# Patient Record
Sex: FEMALE | Race: BLACK OR AFRICAN AMERICAN | Employment: UNEMPLOYED | URBAN - METROPOLITAN AREA
[De-identification: names, ages, dates, MRNs, and addresses within clinical notes are randomized per-mention and may not be internally consistent; named-entity substitution may affect disease eponyms.]

---

## 2017-01-10 ENCOUNTER — OFFICE VISIT (OUTPATIENT)
Dept: FAMILY MEDICINE CLINIC | Age: 62
End: 2017-01-10

## 2017-01-10 ENCOUNTER — HOSPITAL ENCOUNTER (OUTPATIENT)
Dept: LAB | Age: 62
Discharge: HOME | End: 2017-01-10
Payer: COMMERCIAL

## 2017-01-10 VITALS
SYSTOLIC BLOOD PRESSURE: 110 MMHG | HEART RATE: 77 BPM | DIASTOLIC BLOOD PRESSURE: 70 MMHG | RESPIRATION RATE: 20 BRPM | TEMPERATURE: 98.4 F | OXYGEN SATURATION: 99 % | BODY MASS INDEX: 22.71 KG/M2 | WEIGHT: 133 LBS | HEIGHT: 64 IN

## 2017-01-10 DIAGNOSIS — Z12.4 SCREENING FOR CERVICAL CANCER: ICD-10-CM

## 2017-01-10 DIAGNOSIS — S29.012A UPPER BACK STRAIN, INITIAL ENCOUNTER: ICD-10-CM

## 2017-01-10 DIAGNOSIS — Z01.419 GYNECOLOGIC EXAM NORMAL: Primary | ICD-10-CM

## 2017-01-10 DIAGNOSIS — I10 HYPERTENSION, GOAL BELOW 140/90: Chronic | ICD-10-CM

## 2017-01-10 LAB
BILIRUB UR QL STRIP: NEGATIVE
GLUCOSE UR-MCNC: NEGATIVE MG/DL
HBA1C MFR BLD HPLC: 5.8 %
KETONES P FAST UR STRIP-MCNC: NEGATIVE MG/DL
PH UR STRIP: 5.5 [PH] (ref 4.6–8)
PROT UR QL STRIP: NEGATIVE MG/DL
SP GR UR STRIP: 1 (ref 1–1.03)
UA UROBILINOGEN AMB POC: NORMAL (ref 0.2–1)
URINALYSIS CLARITY POC: CLEAR
URINALYSIS COLOR POC: YELLOW
URINE BLOOD POC: NEGATIVE
URINE LEUKOCYTES POC: NEGATIVE
URINE NITRITES POC: NEGATIVE

## 2017-01-10 PROCEDURE — 88175 CYTOPATH C/V AUTO FLUID REDO: CPT | Performed by: NURSE PRACTITIONER

## 2017-01-10 RX ORDER — VARENICLINE TARTRATE 0.5 MG/1
0.5 TABLET, FILM COATED ORAL 2 TIMES DAILY
COMMUNITY

## 2017-01-10 NOTE — PATIENT INSTRUCTIONS
Back Stretches: Exercises  Your Care Instructions  Here are some examples of exercises for stretching your back. Start each exercise slowly. Ease off the exercise if you start to have pain. Your doctor or physical therapist will tell you when you can start these exercises and which ones will work best for you. How to do the exercises  Overhead stretch    1. Stand comfortably with your feet shoulder-width apart. 2. Looking straight ahead, raise both arms over your head and reach toward the ceiling. Do not allow your head to tilt back. 3. Hold for 15 to 30 seconds, then lower your arms to your sides. 4. Repeat 2 to 4 times. Side stretch    1. Stand comfortably with your feet shoulder-width apart. 2. Raise one arm over your head, and then lean to the other side. 3. Slide your hand down your leg as you let the weight of your arm gently stretch your side muscles. Hold for 15 to 30 seconds. 4. Repeat 2 to 4 times on each side. Press-up    1. Lie on your stomach, supporting your body with your forearms. 2. Press your elbows down into the floor to raise your upper back. As you do this, relax your stomach muscles and allow your back to arch without using your back muscles. As your press up, do not let your hips or pelvis come off the floor. 3. Hold for 15 to 30 seconds, then relax. 4. Repeat 2 to 4 times. Relax and rest    1. Lie on your back with a rolled towel under your neck and a pillow under your knees. Extend your arms comfortably to your sides. 2. Relax and breathe normally. 3. Remain in this position for about 10 minutes. 4. If you can, do this 2 or 3 times each day. Follow-up care is a key part of your treatment and safety. Be sure to make and go to all appointments, and call your doctor if you are having problems. It's also a good idea to know your test results and keep a list of the medicines you take. Where can you learn more? Go to http://romie-daysi.info/.   Enter G191 in the search box to learn more about \"Back Stretches: Exercises. \"  Current as of: May 23, 2016  Content Version: 11.1  © 6852-9798 [employer]. Care instructions adapted under license by [employer] (which disclaims liability or warranty for this information). If you have questions about a medical condition or this instruction, always ask your healthcare professional. [address] any warranty or liability for your use of this information. Healthy Upper Back: Exercises  Your Care Instructions  Here are some examples of exercises for your upper back. Start each exercise slowly. Ease off the exercise if you start to have pain. Your doctor or physical therapist will tell you when you can start these exercises and which ones will work best for you. How to do the exercises  Lower neck and upper back stretch    5. Stretch your arms out in front of your body. Clasp one hand on top of your other hand. 6. Gently reach out so that you feel your shoulder blades stretching away from each other. 7. Gently bend your head forward. 8. Hold for 15 to 30 seconds. 9. Repeat 2 to 4 times. Midback stretch    Note: If you have knee pain, do not do this exercise. 5. Kneel on the floor, and sit back on your ankles. 6. Lean forward, place your hands on the floor, and stretch your arms out in front of you. Rest your head between your arms. 7. Gently push your chest toward the floor, reaching as far in front of you as possible. 8. Hold for 15 to 30 seconds. 9. Repeat 2 to 4 times. Shoulder rolls    5. Sit comfortably with your feet shoulder-width apart. You can also do this exercise while standing. 6. Roll your shoulders up, then back, and then down in a smooth, circular motion. 7. Repeat 2 to 4 times. Wall push-up    5. Stand against a wall with your feet about 12 to 24 inches back from the wall.  If you feel any pain when you do this exercise, stand closer to the wall.  6. Place your hands on the wall slightly wider apart than your shoulders, and lean forward. 7. Gently lean your body toward the wall. Then push back to your starting position. Keep the motion smooth and controlled. 8. Repeat 8 to 12 times. Resisted shoulder blade squeeze    Note: For this exercise, you will need elastic exercise material, such as surgical tubing or Thera-Band. 1. Sit or stand, holding the band in both hands in front of you. Keep your elbows close to your sides, bent at a 90-degree angle. Your palms should face up. 2. Squeeze your shoulder blades together, and move your arms to the outside, stretching the band. Be sure to keep your elbows at your sides while you do this. 3. Relax. 4. Repeat 8 to 12 times. Resisted rows    Note: For this exercise, you will need elastic exercise material, such as surgical tubing or Thera-Band. 1. Put the band around a solid object, such as a bedpost, at about waist level. Hold one end of the band in each hand. 2. With your elbows at your sides and bent to 90 degrees, pull the band back to move your shoulder blades toward each other. Return to the starting position. 3. Repeat 8 to 12 times. Follow-up care is a key part of your treatment and safety. Be sure to make and go to all appointments, and call your doctor if you are having problems. It's also a good idea to know your test results and keep a list of the medicines you take. Where can you learn more? Go to http://romie-daysi.info/. Enter K284 in the search box to learn more about \"Healthy Upper Back: Exercises. \"  Current as of: May 23, 2016  Content Version: 11.1  © 5184-9694 [employer]. Care instructions adapted under license by [employer] (which disclaims liability or warranty for this information).  If you have questions about a medical condition or this instruction, always ask your healthcare professional. Modesta Crews any warranty or liability for your use of this information.

## 2017-01-10 NOTE — MR AVS SNAPSHOT
Visit Information Date & Time Provider Department Dept. Phone Encounter #  
 1/10/2017 12:30 PM Seven Ruvalcaba NP [facility] [phone] [id number] Follow-up Instructions Return in about 6 months (around 7/10/2017). Upcoming Health Maintenance Date Due  
 PAP AKA CERVICAL CYTOLOGY 11/5/2017 BREAST CANCER SCRN MAMMOGRAM 1/5/2018 COLONOSCOPY 2/2/2018 DTaP/Tdap/Td series (2 - Td) 11/1/2018 Allergies as of 1/10/2017  Review Complete On: 1/10/2017 By: Sherry Pollock LPN Severity Noted Reaction Type Reactions Valtrex [Valacyclovir]  09/09/2010    Rash Current Immunizations  Reviewed on 11/18/2015 Name Date Zoster Vaccine, Live 11/17/2015 Not reviewed this visit You Were Diagnosed With   
  
 Codes Comments Acute bilateral low back pain with right-sided sciatica    -  Primary ICD-10-CM: M54.41 
ICD-9-CM: 724.2, 724.3, 338.19 Hypertension, goal below 140/90     ICD-10-CM: I10 
ICD-9-CM: 401.9 Screening for cervical cancer     ICD-10-CM: Z12.4 ICD-9-CM: V76.2 Vitals BP Pulse Temp Resp Height(growth percentile) Weight(growth percentile) 110/70 77 98.4 °F (36.9 °C) (Oral) 20 5' 4\" (1.626 m) 133 lb (60.3 kg) SpO2 BMI OB Status Smoking Status 99% 22.83 kg/m2 Postmenopausal Current Every Day Smoker Vitals History BMI and BSA Data Body Mass Index Body Surface Area  
 22.83 kg/m 2 1.65 m 2 Preferred Pharmacy Pharmacy Name Phone [facility] [address], [address] [place] [address] Avenue [phone] Your Updated Medication List  
  
   
This list is accurate as of: 1/10/17  1:36 PM.  Always use your most recent med list.  
  
  
  
  
 CHANTIX 0.5 mg tablet Generic drug:  varenicline Take 0.5 mg by mouth two (2) times a day. citalopram 20 mg tablet Commonly known as:  Dawit Flatter Take 1 Tab by mouth daily. lisinopril 20 mg tablet Commonly known as:  PRINIVIL, ZESTRIL  
1 po every morning for blood pressure and kidney protection. We Performed the Following AMB POC HEMOGLOBIN A1C [18332 CPT(R)] AMB POC URINALYSIS DIP STICK AUTO W/ MICRO [00844 CPT(R)] CBC WITH AUTOMATED DIFF [80923 CPT(R)] METABOLIC PANEL, COMPREHENSIVE [19949 CPT(R)] Follow-up Instructions Return in about 6 months (around 7/10/2017). To-Do List   
 01/10/2017 Pathology:  PAP (IMAGE GUIDED) W/REFL HPV ALL PATHOLOGY (001764) Patient Instructions Back Stretches: Exercises Your Care Instructions Here are some examples of exercises for stretching your back. Start each exercise slowly. Ease off the exercise if you start to have pain. Your doctor or physical therapist will tell you when you can start these exercises and which ones will work best for you. How to do the exercises Overhead stretch 1. Stand comfortably with your feet shoulder-width apart. 2. Looking straight ahead, raise both arms over your head and reach toward the ceiling. Do not allow your head to tilt back. 3. Hold for 15 to 30 seconds, then lower your arms to your sides. 4. Repeat 2 to 4 times. Side stretch 1. Stand comfortably with your feet shoulder-width apart. 2. Raise one arm over your head, and then lean to the other side. 3. Slide your hand down your leg as you let the weight of your arm gently stretch your side muscles. Hold for 15 to 30 seconds. 4. Repeat 2 to 4 times on each side. Press-up 1. Lie on your stomach, supporting your body with your forearms. 2. Press your elbows down into the floor to raise your upper back. As you do this, relax your stomach muscles and allow your back to arch without using your back muscles. As your press up, do not let your hips or pelvis come off the floor. 3. Hold for 15 to 30 seconds, then relax. 4. Repeat 2 to 4 times.  
Relax and rest 
 
 1. Lie on your back with a rolled towel under your neck and a pillow under your knees. Extend your arms comfortably to your sides. 2. Relax and breathe normally. 3. Remain in this position for about 10 minutes. 4. If you can, do this 2 or 3 times each day. Follow-up care is a key part of your treatment and safety. Be sure to make and go to all appointments, and call your doctor if you are having problems. It's also a good idea to know your test results and keep a list of the medicines you take. Where can you learn more? Go to http://romie-daysi.info/. Enter Y936 in the search box to learn more about \"Back Stretches: Exercises. \" Current as of: May 23, 2016 Content Version: 11.1 © 9746-9757 LinkCycle. Care instructions adapted under license by TweepsMap (which disclaims liability or warranty for this information). If you have questions about a medical condition or this instruction, always ask your healthcare professional. [place] [address] any warranty or liability for your use of this information. Healthy Upper Back: Exercises Your Care Instructions Here are some examples of exercises for your upper back. Start each exercise slowly. Ease off the exercise if you start to have pain. Your doctor or physical therapist will tell you when you can start these exercises and which ones will work best for you. How to do the exercises Lower neck and upper back stretch 5. Stretch your arms out in front of your body. Clasp one hand on top of your other hand. 6. Gently reach out so that you feel your shoulder blades stretching away from each other. 7. Gently bend your head forward. 8. Hold for 15 to 30 seconds. 9. Repeat 2 to 4 times. Midback stretch Note: If you have knee pain, do not do this exercise. 5. Kneel on the floor, and sit back on your ankles.  
6. Lean forward, place your hands on the floor, and stretch your arms out in front of you. Rest your head between your arms. 7. Gently push your chest toward the floor, reaching as far in front of you as possible. 8. Hold for 15 to 30 seconds. 9. Repeat 2 to 4 times. Shoulder rolls 5. Sit comfortably with your feet shoulder-width apart. You can also do this exercise while standing. 6. Roll your shoulders up, then back, and then down in a smooth, circular motion. 7. Repeat 2 to 4 times. Wall push-up 5. Stand against a wall with your feet about 12 to 24 inches back from the wall. If you feel any pain when you do this exercise, stand closer to the wall. 6. Place your hands on the wall slightly wider apart than your shoulders, and lean forward. 7. Gently lean your body toward the wall. Then push back to your starting position. Keep the motion smooth and controlled. 8. Repeat 8 to 12 times. Resisted shoulder blade squeeze Note: For this exercise, you will need elastic exercise material, such as surgical tubing or Thera-Band. 1. Sit or stand, holding the band in both hands in front of you. Keep your elbows close to your sides, bent at a 90-degree angle. Your palms should face up. 2. Squeeze your shoulder blades together, and move your arms to the outside, stretching the band. Be sure to keep your elbows at your sides while you do this. 3. Relax. 4. Repeat 8 to 12 times. Resisted rows Note: For this exercise, you will need elastic exercise material, such as surgical tubing or Thera-Band. 1. Put the band around a solid object, such as a bedpost, at about waist level. Hold one end of the band in each hand. 2. With your elbows at your sides and bent to 90 degrees, pull the band back to move your shoulder blades toward each other. Return to the starting position. 3. Repeat 8 to 12 times. Follow-up care is a key part of your treatment and safety.  Be sure to make and go to all appointments, and call your doctor if you are having problems. It's also a good idea to know your test results and keep a list of the medicines you take. Where can you learn more? Go to http://romie-daysi.info/. Enter V888 in the search box to learn more about \"Healthy Upper Back: Exercises. \" Current as of: May 23, 2016 Content Version: 11.1 © 4135-6489 [employer]ion Corporation. Care instructions adapted under license by Centrality Communications (which disclaims liability or warranty for this information). If you have questions about a medical condition or this instruction, always ask your healthcare professional. [address] any warranty or liability for your use of this information. Introducing [facility]! [facility] introduces spigit patient portal. Now you can access parts of your medical record, email your doctor's office, and request medication refills online. 1. In your internet browser, go to [email or web address] 2. Click on the First Time User? Click Here link in the Sign In box. You will see the New Member Sign Up page. 3. Enter your spigit Access Code exactly as it appears below. You will not need to use this code after youve completed the sign-up process. If you do not sign up before the expiration date, you must request a new code. · spigit Access Code: [email or web address] Expires: 1/31/2017  2:11 PM 
 
4. Enter the last four digits of your Social Security Number (xxxx) and Date of Birth (mm/dd/yyyy) as indicated and click Submit. You will be taken to the next sign-up page. 5. Create a spigit ID. This will be your spigit login ID and cannot be changed, so think of one that is secure and easy to remember. 6. Create a spigit password. You can change your password at any time. 7. Enter your Password Reset Question and Answer. This can be used at a later time if you forget your password. 8. Enter your e-mail address.  You will receive e-mail notification when new information is available in Replicon. 9. Click Sign Up. You can now view and download portions of your medical record. 10. Click the Download Summary menu link to download a portable copy of your medical information. If you have questions, please visit the Frequently Asked Questions section of the Replicon website. Remember, Replicon is NOT to be used for urgent needs. For medical emergencies, dial 911. Now available from your iPhone and Android! Please provide this summary of care documentation to your next provider. Your primary care clinician is listed as [address] Trell Fuller. If you have any questions after today's visit, please call [phone].

## 2017-01-10 NOTE — PROGRESS NOTES
HISTORY OF PRESENT ILLNESS  Brandon Camilo is a 64 y.o. female. HPI  Patient comes in today for pap and low back pain. Patient states a certain way she moves, pain located on right lower back. States pain has been severe enough she hasn't been able to move. Works in [employer], was lifting a pan and had a pain shoot through back. Pain radiated down right leg once. Some numbness and tingling in feet. No loss of bowel or bladder. Some urinary frequency. Some stress incontinence. Denies dysuria. Some urinary frequency. LMP: 8-10 years. Denies abnormal vaginal bleeding. Denies vaginal complaints. Allergies   Allergen Reactions    Valtrex [Valacyclovir] Rash       Past Medical History   Diagnosis Date    Allergic rhinitis     Anxiety and depression     Hypertension     Insomnia     Other ill-defined conditions(439.89)      high cholesterol       Past Surgical History   Procedure Laterality Date    Hx gyn        c section x 1    Hx carpal tunnel release       right hand    Hx appendectomy         Social History     Social History    Marital status: LEGALLY      Spouse name: N/A    Number of children: N/A    Years of education: N/A     Occupational History    Not on file. Social History Main Topics    Smoking status: Current Every Day Smoker    Smokeless tobacco: Never Used    Alcohol use 0.0 oz/week     1 - 2 Cans of beer per week    Drug use: No    Sexual activity: No     Other Topics Concern    Not on file     Social History Narrative       Family History   Problem Relation Age of Onset    Hypertension Mother     Asthma Mother        Current Outpatient Prescriptions   Medication Sig    varenicline (CHANTIX) 0.5 mg tablet Take 0.5 mg by mouth two (2) times a day.  lisinopril (PRINIVIL, ZESTRIL) 20 mg tablet 1 po every morning for blood pressure and kidney protection.  citalopram (CELEXA) 20 mg tablet Take 1 Tab by mouth daily.      No current facility-administered medications for this visit. Review of Systems   Constitutional: Negative for chills, fever and malaise/fatigue. Respiratory: Negative for shortness of breath. Cardiovascular: Negative for chest pain, palpitations and leg swelling. Gastrointestinal: Negative for abdominal pain, nausea and vomiting. Genitourinary: Negative for dysuria, flank pain, frequency, hematuria and urgency. Musculoskeletal: Positive for back pain (right upper back radiating down to lower back, improved). Skin: Negative. Neurological: Negative for dizziness, tingling, sensory change, focal weakness and headaches. Endo/Heme/Allergies: Negative for polydipsia. Vitals:    01/10/17 1253   BP: 110/70   Pulse: 77   Resp: 20   Temp: 98.4 °F (36.9 °C)   TempSrc: Oral   SpO2: 99%   Weight: 133 lb (60.3 kg)   Height: 5' 4\" (1.626 m)     Physical Exam   Constitutional: She is oriented to person, place, and time. Vital signs are normal. She appears well-developed and well-nourished. She is cooperative. Cardiovascular: Normal rate. Pulmonary/Chest: Effort normal.   Abdominal: Soft. Normal appearance and bowel sounds are normal. There is no hepatosplenomegaly. There is no tenderness. There is no CVA tenderness. Genitourinary: Uterus normal. Rectal exam shows external hemorrhoid. Rectal exam shows guaiac negative stool. There is no rash, tenderness or lesion on the right labia. There is no rash, tenderness or lesion on the left labia. Uterus is not enlarged. Cervix exhibits no motion tenderness. Right adnexum displays no mass, no tenderness and no fullness. Left adnexum displays no mass, no tenderness and no fullness. No erythema or tenderness in the vagina. No vaginal discharge found. Musculoskeletal:        Cervical back: Normal.        Thoracic back: Normal.        Lumbar back: Normal.   Muscle strength BUE and BLEs 5/5 and symmetric. Sensation intact.   Negative SLR bilaterally   Neurological: She is alert and oriented to person, place, and time. Skin: Skin is warm and dry. Psychiatric: She has a normal mood and affect. Her behavior is normal. Judgment and thought content normal.     ASSESSMENT and PLAN    ICD-10-CM ICD-9-CM    1. Gynecologic exam normal Z01.419 V72.31 AMB POC URINALYSIS DIP STICK AUTO W/ MICRO      PAP (IMAGE GUIDED) W/REFL HPV ALL PATHOLOGY (480410)      PAP (IMAGE GUIDED) W/REFL HPV ALL PATHOLOGY ([id number])   2. Screening for cervical cancer Z12.4 V76.2 PAP (IMAGE GUIDED) W/REFL HPV ALL PATHOLOGY ([id number])      PAP (IMAGE GUIDED) W/REFL HPV ALL PATHOLOGY ([id number])   3. Acute bilateral low back pain with right-sided sciatica M54.41 724.2 AMB POC URINALYSIS DIP STICK AUTO W/ MICRO     724.3      338.19    4. Hypertension, goal below 140/90 C73 736.0 METABOLIC PANEL, COMPREHENSIVE      CBC WITH AUTOMATED DIFF      AMB POC HEMOGLOBIN A1C     Encounter Diagnoses   Name Primary?  Gynecologic exam normal Yes    Screening for cervical cancer     Acute bilateral low back pain with right-sided sciatica     Hypertension, goal below 140/90      Orders Placed This Encounter    METABOLIC PANEL, COMPREHENSIVE    CBC WITH AUTOMATED DIFF    AMB POC URINALYSIS DIP STICK AUTO W/ MICRO    AMB POC HEMOGLOBIN A1C    varenicline (CHANTIX) 0.5 mg tablet    PAP (IMAGE GUIDED) W/REFL HPV ALL PATHOLOGY ([id number])     Kenna Vidal was seen today for back pain and gyn exam.    Diagnoses and all orders for this visit:    Gynecologic exam normal / Screening for cervical cancer  -     AMB POC URINALYSIS DIP STICK AUTO W/ MICRO  -     PAP (IMAGE GUIDED) W/REFL HPV ALL PATHOLOGY ([id number]); Future  -     PAP (IMAGE GUIDED) W/REFL HPV ALL PATHOLOGY (470702)    Upper back strain  -     OTC NSAIDs prn, heat/ice, HEP. RTC if no improvement in 2-3 weeks.     Hypertension, goal below 140/90 - stable  -     METABOLIC PANEL, COMPREHENSIVE  -     CBC WITH AUTOMATED DIFF  -     AMB POC HEMOGLOBIN A1C      Follow-up Disposition:  Return in about 6 months (around 7/10/2017). for HTN follow up.  lab results and schedule of future lab studies reviewed with patient    I have reviewed the patient's allergies and made any necessary changes. Medical, procedural, social and family histories have been reviewed and updated as medically indicated. I have reconciled and/or revised patient medications in the EMR. I have discussed each diagnosis listed in this note with Adam Mills and/or their family. I have discussed treatment options and the risk/benefit analysis of those options, including safe use of medications and possible medication side effects. Through the use of shared decision making we have agreed to the above plan. The patient has received an after-visit summary and questions were answered concerning future plans. Crissy High, NIKI-C    This note will not be viewable in [facility].

## 2017-01-11 LAB
ALBUMIN SERPL-MCNC: 4.8 G/DL (ref 3.6–4.8)
ALBUMIN/GLOB SERPL: 2.3 {RATIO} (ref 1.1–2.5)
ALP SERPL-CCNC: 96 IU/L (ref 39–117)
ALT SERPL-CCNC: 12 IU/L (ref 0–32)
AST SERPL-CCNC: 17 IU/L (ref 0–40)
BASOPHILS # BLD AUTO: 0 X10E3/UL (ref 0–0.2)
BASOPHILS NFR BLD AUTO: 0 %
BILIRUB SERPL-MCNC: 0.3 MG/DL (ref 0–1.2)
BUN SERPL-MCNC: 14 MG/DL (ref 8–27)
BUN/CREAT SERPL: 16 (ref 11–26)
CALCIUM SERPL-MCNC: 9.7 MG/DL (ref 8.7–10.3)
CHLORIDE SERPL-SCNC: 101 MMOL/L (ref 96–106)
CO2 SERPL-SCNC: 24 MMOL/L (ref 18–29)
CREAT SERPL-MCNC: 0.85 MG/DL (ref 0.57–1)
EOSINOPHIL # BLD AUTO: 0.3 X10E3/UL (ref 0–0.4)
EOSINOPHIL NFR BLD AUTO: 3 %
ERYTHROCYTE [DISTWIDTH] IN BLOOD BY AUTOMATED COUNT: 13.8 % (ref 12.3–15.4)
GLOBULIN SER CALC-MCNC: 2.1 G/DL (ref 1.5–4.5)
GLUCOSE SERPL-MCNC: 78 MG/DL (ref 65–99)
HCT VFR BLD AUTO: 45.2 % (ref 34–46.6)
HGB BLD-MCNC: 15.7 G/DL (ref 11.1–15.9)
IMM GRANULOCYTES # BLD: 0 X10E3/UL (ref 0–0.1)
IMM GRANULOCYTES NFR BLD: 0 %
LYMPHOCYTES # BLD AUTO: 3.4 X10E3/UL (ref 0.7–3.1)
LYMPHOCYTES NFR BLD AUTO: 33 %
MCH RBC QN AUTO: 32.3 PG (ref 26.6–33)
MCHC RBC AUTO-ENTMCNC: 34.7 G/DL (ref 31.5–35.7)
MCV RBC AUTO: 93 FL (ref 79–97)
MONOCYTES # BLD AUTO: 0.8 X10E3/UL (ref 0.1–0.9)
MONOCYTES NFR BLD AUTO: 8 %
NEUTROPHILS # BLD AUTO: 5.9 X10E3/UL (ref 1.4–7)
NEUTROPHILS NFR BLD AUTO: 56 %
PLATELET # BLD AUTO: 323 X10E3/UL (ref 150–379)
POTASSIUM SERPL-SCNC: 4.4 MMOL/L (ref 3.5–5.2)
PROT SERPL-MCNC: 6.9 G/DL (ref 6–8.5)
RBC # BLD AUTO: 4.86 X10E6/UL (ref 3.77–5.28)
SODIUM SERPL-SCNC: 142 MMOL/L (ref 134–144)
WBC # BLD AUTO: 10.5 X10E3/UL (ref 3.4–10.8)

## 2022-01-21 ENCOUNTER — TRANSCRIBE ORDER (OUTPATIENT)
Dept: SCHEDULING | Age: 67
End: 2022-01-21

## 2022-01-21 DIAGNOSIS — Z12.31 SCREENING MAMMOGRAM FOR HIGH-RISK PATIENT: Primary | ICD-10-CM

## 2022-03-03 ENCOUNTER — HOSPITAL ENCOUNTER (OUTPATIENT)
Dept: MAMMOGRAPHY | Age: 67
Discharge: HOME | End: 2022-03-03
Attending: FAMILY MEDICINE
Payer: MEDICARE

## 2022-03-03 DIAGNOSIS — Z12.31 SCREENING MAMMOGRAM FOR HIGH-RISK PATIENT: ICD-10-CM

## 2022-03-03 PROCEDURE — 77067 SCR MAMMO BI INCL CAD: CPT

## 2023-03-01 ENCOUNTER — TRANSCRIBE ORDER (OUTPATIENT)
Dept: SCHEDULING | Age: 68
End: 2023-03-01

## 2023-03-01 DIAGNOSIS — Z12.31 BREAST CANCER SCREENING BY MAMMOGRAM: Primary | ICD-10-CM

## 2023-03-31 ENCOUNTER — HOSPITAL ENCOUNTER (OUTPATIENT)
Dept: MAMMOGRAPHY | Age: 68
Discharge: HOME | End: 2023-03-31
Attending: FAMILY MEDICINE
Payer: MEDICARE

## 2023-03-31 DIAGNOSIS — Z12.31 BREAST CANCER SCREENING BY MAMMOGRAM: ICD-10-CM

## 2023-03-31 PROCEDURE — 77067 SCR MAMMO BI INCL CAD: CPT

## 2023-12-05 ENCOUNTER — TELEPHONE (OUTPATIENT)
Dept: PHARMACY | Facility: CLINIC | Age: 68
End: 2023-12-05

## 2023-12-05 NOTE — TELEPHONE ENCOUNTER
[facility] CLINICAL PHARMACY: ADHERENCE REVIEW  Identified care gap per [place] [facility] ACE/ARB adherence    Last Visit: NA  Next Visit: NA  [facility] [address] [facility]     Patient also appears to be prescribed:No Others in the metric at this time  Medicare Advantage - [facility] Records claims through 12/5/2023  (PY [address] = not reported; YTD [address] = 79%; Potential Fail Date: 12/9/2023):   Drug:  Last Filled:  Due:  Qty:  Supply:  Prescriber:  Pharmacy:    Refill Status:  Max Refill Date:  Absolute Fail Date: LOSARTAN/HCT TAB 50-12.5   06/28/2023 09/26/2023  90  90 Days  [address]  [phone]  04 days late  10/09/2023  12/09/2023 [facility]    [facility]   [address]  [phone]     BP Readings from Last 3 Encounters:   No data found for BP     CrCl cannot be calculated (No successful lab value found. ). No results found for: \"CREATININE\"  No results found for: \"K\"    PLAN  The following are interventions that have been identified:   Patient overdue refilling LOSARTAN/HCT TAB 50-12.5 and active on home medication list.   Cannot determine patients correct phone, or pharmacy at this time. All phones are busy/or disconnected  Address in epic is different than address in insurance- without patient verification I do not want to change to for letter.     Ginna Tucker, PharmD, [facility]   Department, toll free: [phone] Option 2    For Pharmacy Admin Tracking Only    Program: Juancho in place:  No  Gap Closed?: No   Time Spent (min): 20

## 2024-06-21 SDOH — HEALTH STABILITY: PHYSICAL HEALTH: ON AVERAGE, HOW MANY MINUTES DO YOU ENGAGE IN EXERCISE AT THIS LEVEL?: 0 MIN

## 2024-06-21 SDOH — HEALTH STABILITY: PHYSICAL HEALTH: ON AVERAGE, HOW MANY DAYS PER WEEK DO YOU ENGAGE IN MODERATE TO STRENUOUS EXERCISE (LIKE A BRISK WALK)?: 0 DAYS
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
## 2024-06-24 ENCOUNTER — OFFICE VISIT (OUTPATIENT)
Age: 69
End: 2024-06-24
Payer: MEDICARE

## 2024-06-24 VITALS — HEIGHT: 64 IN | TEMPERATURE: 97.3 F | WEIGHT: 142 LBS | BODY MASS INDEX: 24.24 KG/M2

## 2024-06-24 DIAGNOSIS — M54.50 LUMBAR PAIN: ICD-10-CM

## 2024-06-24 DIAGNOSIS — M79.10 MYALGIA: ICD-10-CM

## 2024-06-24 DIAGNOSIS — M25.551 HIP PAIN, RIGHT: Primary | ICD-10-CM

## 2024-06-24 DIAGNOSIS — M47.816 FACET ARTHRITIS OF LUMBAR REGION: ICD-10-CM

## 2024-06-24 DIAGNOSIS — M79.18 RIGHT BUTTOCK PAIN: ICD-10-CM

## 2024-06-24 DIAGNOSIS — M16.11 UNILATERAL PRIMARY OSTEOARTHRITIS, RIGHT HIP: ICD-10-CM

## 2024-06-24 PROCEDURE — 20552 NJX 1/MLT TRIGGER POINT 1/2: CPT | Performed by: SPECIALIST

## 2024-06-24 PROCEDURE — 73502 X-RAY EXAM HIP UNI 2-3 VIEWS: CPT | Performed by: SPECIALIST

## 2024-06-24 PROCEDURE — 1123F ACP DISCUSS/DSCN MKR DOCD: CPT | Performed by: SPECIALIST

## 2024-06-24 PROCEDURE — 99203 OFFICE O/P NEW LOW 30 MIN: CPT | Performed by: SPECIALIST

## 2024-06-24 RX ORDER — BETAMETHASONE SODIUM PHOSPHATE AND BETAMETHASONE ACETATE 3; 3 MG/ML; MG/ML
3 INJECTION, SUSPENSION INTRA-ARTICULAR; INTRALESIONAL; INTRAMUSCULAR; SOFT TISSUE ONCE
Status: COMPLETED | OUTPATIENT
Start: 2024-06-24 | End: 2024-06-24

## 2024-06-24 RX ADMIN — BETAMETHASONE SODIUM PHOSPHATE AND BETAMETHASONE ACETATE 3 MG: 3; 3 INJECTION, SUSPENSION INTRA-ARTICULAR; INTRALESIONAL; INTRAMUSCULAR; SOFT TISSUE at 09:46

## 2024-08-07 ENCOUNTER — OFFICE VISIT (OUTPATIENT)
Age: 69
End: 2024-08-07
Payer: MEDICARE

## 2024-08-07 VITALS — HEIGHT: 64 IN | WEIGHT: 147 LBS | BODY MASS INDEX: 25.1 KG/M2

## 2024-08-07 DIAGNOSIS — M47.816 FACET ARTHRITIS OF LUMBAR REGION: Primary | ICD-10-CM

## 2024-08-07 DIAGNOSIS — G89.29 CHRONIC BILATERAL LOW BACK PAIN WITH RIGHT-SIDED SCIATICA: ICD-10-CM

## 2024-08-07 DIAGNOSIS — M54.41 CHRONIC BILATERAL LOW BACK PAIN WITH RIGHT-SIDED SCIATICA: ICD-10-CM

## 2024-08-07 PROCEDURE — 99203 OFFICE O/P NEW LOW 30 MIN: CPT | Performed by: PHYSICAL MEDICINE & REHABILITATION

## 2024-08-07 PROCEDURE — 1123F ACP DISCUSS/DSCN MKR DOCD: CPT | Performed by: PHYSICAL MEDICINE & REHABILITATION

## 2024-08-07 RX ORDER — LOSARTAN POTASSIUM AND HYDROCHLOROTHIAZIDE 12.5; 1 MG/1; MG/1
1 TABLET ORAL DAILY
COMMUNITY
Start: 2024-07-26

## 2024-08-07 SDOH — HEALTH STABILITY: PHYSICAL HEALTH: ON AVERAGE, HOW MANY MINUTES DO YOU ENGAGE IN EXERCISE AT THIS LEVEL?: 0 MIN

## 2024-08-07 SDOH — HEALTH STABILITY: PHYSICAL HEALTH: ON AVERAGE, HOW MANY DAYS PER WEEK DO YOU ENGAGE IN MODERATE TO STRENUOUS EXERCISE (LIKE A BRISK WALK)?: 0 DAYS

## 2024-08-19 ENCOUNTER — HOSPITAL ENCOUNTER (OUTPATIENT)
Facility: HOSPITAL | Age: 69
Setting detail: RECURRING SERIES
Discharge: HOME | End: 2024-08-22
Payer: MEDICARE

## 2024-08-19 PROCEDURE — 97535 SELF CARE MNGMENT TRAINING: CPT

## 2024-08-19 PROCEDURE — 97161 PT EVAL LOW COMPLEX 20 MIN: CPT

## 2024-08-19 NOTE — PROGRESS NOTES
PHYSICAL / OCCUPATIONAL THERAPY - DAILY TREATMENT NOTE (updated )  For Eval visit    Patient Name: Rakel Peña    Date: 2024    : 1955  Insurance: Payor: [facility] MEDICARE / Plan: [facility] MEDICARE ADVANTAGE / Product Type: *No Product type* /      Patient  verified yes     Visit #   Current / Total 1 16   Time   In / Out 12:23 1:05   Pain   In / Out 4 3   Subjective Functional Status/Changes: See POC     TREATMENT AREA =  see POC    OBJECTIVE       32 min   Eval - untimed                      Therapeutic Procedures:    Tx Min Billable or 1:1 Min (if diff from Tx Min) Procedure, Rationale, Specifics   10  09471 Self Care/Home Management (timed):  improve patient knowledge and understanding of activity modification, diagnosis/prognosis, and physical therapy expectations, procedures and progression  to improve patient's ability to progress to PLOF and address remaining functional goals.  (see flow sheet as applicable)     Details if applicable:    -including trial/demo of HEP exercises with pt demonstrating good understanding/tolerance; handout issued          Details if applicable:            Details if applicable:            Details if applicable:     10  [facility] Totals Reminder: bill using total billable min of TIMED therapeutic procedures (example: do not include dry needle or estim unattended, both untimed codes, in totals to left)  8-22 min = 1 unit; 23-37 min = 2 units; 38-52 min = 3 units; 53-67 min = 4 units; 68-82 min = 5 units   Total Total     [x]  Patient Education billed concurrently with other procedures   [x] Review HEP    [] Progressed/Changed HEP, detail:    [] Other detail:       Objective Information/Functional Measures/Assessment    See POC    Patient will continue to benefit from skilled PT / OT services to modify and progress therapeutic interventions, analyze and address functional mobility deficits, analyze and address ROM deficits, analyze and address strength deficits,

## 2024-08-19 NOTE — THERAPY EVALUATION
[facility]  [address] [place] [address]  Phone: [phone]   [phone]  Plan of Care / Statement of Necessity for Physical Therapy Services     Patient Name: Rakel Peña : 1955   Medical   Diagnosis: Other low back pain [M54.59]  Treatment Diagnosis:   Other low back pain [M54.59]     Onset Date:      Referral Source: Zach Olivas* Start of Care (SOC): 2024   Prior Hospitalization: See medical history Provider #: [id number]   Prior Level of Function: No limitation to sitting/driving prior to onset. Recreational activity: walking   Comorbidities: Musculoskeletal disorders, HTN, arthirtis     Assessment / key information:  Pt is a 69 y.o. year old female with subjective complaints of LBP/R leg pain.  LENARD: Pt c/o insidious onset of R hip/leg pain about a year ago which has progressed to R sided LBP. Describes \"heat/burning\" to her right buttock region. Pt went to see ortho in the spring 2/2 increasing pain. Pt had xrays completed and reports \"a little bit of arthritis\" and sciatica. Pt completed Prednisone taper with good benefit. Pt returned to ortho ([facility])  ongoing pain and received injection to R hip (good relief ~1 month). Pt seen by Dr. Olivas 24 and recommended for outpt PT and issued some home exercises; pt has not initiated yet. Pt c/o intermittent N/T to b/l toes for ~1 year.   Pt denies red flags.     Current pain is rated as 3 to 10/10. Localized to R upper thigh and lower right back/hip  Current functional limitations:  sitting (worse on soft surfaces), driving, walking long distance  Oswestry score= 42% indicating severe impairment to functional activities.    Today's evaulation is significant for:  Fall Risk Assessment: Does the patient have a fear of falling? YES. Pt c/o fear of fall with \"climbing up stuff\", I.e. stairs (descending>ascending).    If yes, what fall risk

## 2024-08-21 ENCOUNTER — HOSPITAL ENCOUNTER (OUTPATIENT)
Facility: HOSPITAL | Age: 69
Setting detail: RECURRING SERIES
Discharge: HOME | End: 2024-08-24
Payer: MEDICARE

## 2024-08-21 PROCEDURE — 97140 MANUAL THERAPY 1/> REGIONS: CPT

## 2024-08-21 PROCEDURE — 97112 NEUROMUSCULAR REEDUCATION: CPT

## 2024-08-21 PROCEDURE — 97110 THERAPEUTIC EXERCISES: CPT

## 2024-08-21 NOTE — PROGRESS NOTES
PHYSICAL / OCCUPATIONAL THERAPY - DAILY TREATMENT NOTE    Patient Name: Rakel Peña    Date: 2024    : 1955  Insurance: Payor: [facility] MEDICARE / Plan: [facility] MEDICARE ADVANTAGE / Product Type: *No Product type* /      Patient  verified Yes     Visit #   Current / Total 2 16   Time   In / Out 8:55 9:33   Pain   In / Out 3 1   Subjective Functional Status/Changes: Tried her HEP yesterday. Felt like they were a little helpful. Had trouble figuring out piriformis stretch     TREATMENT AREA =  Other low back pain [M54.59]     OBJECTIVE      Therapeutic Procedures:    Tx Min Billable or 1:1 Min (if diff from Tx Min) Procedure, Rationale, Specifics   15 15 39345 Therapeutic Exercise (timed):  increase ROM, strength, coordination, balance, and proprioception to improve patient's ability to progress to PLOF and address remaining functional goals. (see flow sheet as applicable)     Details if applicable:       15 15 71180 Neuromuscular Re-Education (timed):  improve balance, coordination, kinesthetic sense, posture, core stability and proprioception to improve patient's ability to develop conscious control of individual muscles and awareness of position of extremities in order to progress to PLOF and address remaining functional goals. (see flow sheet as applicable)     Details if applicable:      46479 Manual Therapy (timed):  decrease pain, increase ROM, increase tissue extensibility, and decrease trigger points to improve patient's ability to progress to PLOF and address remaining functional goals.  The manual therapy interventions were performed at a separate and distinct time from the therapeutic activities interventions . (see flow sheet as applicable)     Details if applicable:    -STM R piriformis, glutes, l/s paraspinals in L s/l. Manual R psoas stretch in s/l          Details if applicable:            Details if applicable:     38 38 [facility] BC Totals Reminder: bill using total billable min of

## 2024-08-27 ENCOUNTER — HOSPITAL ENCOUNTER (OUTPATIENT)
Facility: HOSPITAL | Age: 69
Setting detail: RECURRING SERIES
Discharge: HOME | End: 2024-08-30
Payer: MEDICARE

## 2024-08-27 PROCEDURE — 97110 THERAPEUTIC EXERCISES: CPT

## 2024-08-27 PROCEDURE — 97530 THERAPEUTIC ACTIVITIES: CPT

## 2024-08-27 PROCEDURE — 97112 NEUROMUSCULAR REEDUCATION: CPT

## 2024-08-27 NOTE — PROGRESS NOTES
PHYSICAL / OCCUPATIONAL THERAPY - DAILY TREATMENT NOTE    Patient Name: Rakel Peña    Date: 2024    : 1955  Insurance: Payor: [facility] MEDICARE / Plan: [facility] MEDICARE ADVANTAGE / Product Type: *No Product type* /      Patient  verified Yes     Visit #   Current / Total 3 16   Time   In / Out 1017 1059   Pain   In / Out 6 4   Subjective Functional Status/Changes: Pt stated she had to take a tylenol yesterday for the pain.      TREATMENT AREA =  Other low back pain [M54.59]     OBJECTIVE      Therapeutic Procedures:    Tx Min Billable or 1:1 Min (if diff from Tx Min) Procedure, Rationale, Specifics   15  43617 Therapeutic Exercise (timed):  increase ROM, strength, coordination, balance, and proprioception to improve patient's ability to progress to PLOF and address remaining functional goals. (see flow sheet as applicable)     Details if applicable:       12  70587 Neuromuscular Re-Education (timed):  improve balance, coordination, kinesthetic sense, posture, core stability and proprioception to improve patient's ability to develop conscious control of individual muscles and awareness of position of extremities in order to progress to PLOF and address remaining functional goals. (see flow sheet as applicable)     Details if applicable:       87187 Manual Therapy (timed):  decrease pain, increase ROM, increase tissue extensibility, and decrease trigger points to improve patient's ability to progress to PLOF and address remaining functional goals.  The manual therapy interventions were performed at a separate and distinct time from the therapeutic activities interventions . (see flow sheet as applicable)     Details if applicable:    -   15   48101 Therapeutic Activity (timed):  use of dynamic activities replicating functional movements to increase ROM, strength, coordination, balance, and proprioception in order to improve patient's ability to progress to PLOF and address remaining functional goals.

## 2024-08-29 ENCOUNTER — HOSPITAL ENCOUNTER (OUTPATIENT)
Facility: HOSPITAL | Age: 69
Setting detail: RECURRING SERIES
End: 2024-08-29
Payer: MEDICARE

## 2024-08-29 PROCEDURE — 97140 MANUAL THERAPY 1/> REGIONS: CPT

## 2024-08-29 PROCEDURE — 97110 THERAPEUTIC EXERCISES: CPT

## 2024-08-29 PROCEDURE — 97112 NEUROMUSCULAR REEDUCATION: CPT

## 2024-08-29 NOTE — PROGRESS NOTES
PHYSICAL / OCCUPATIONAL THERAPY - DAILY TREATMENT NOTE    Patient Name: Rakel Peña    Date: 2024    : 1955  Insurance: Payor: [facility] MEDICARE / Plan: [facility] MEDICARE ADVANTAGE / Product Type: *No Product type* /      Patient  verified Yes     Visit #   Current / Total 4 16   Time   In / Out 1020 1100   Pain   In / Out 3/10 2/10   Subjective Functional Status/Changes: A lot better than Tuesday when I was here.  Feeling better.  I did vacuum, went surprising well     TREATMENT AREA =  Other low back pain [M54.59]     OBJECTIVE      Therapeutic Procedures:    Tx Min Billable or 1:1 Min (if diff from Tx Min) Procedure, Rationale, Specifics   15  41847 Therapeutic Exercise (timed):  increase ROM, strength, coordination, balance, and proprioception to improve patient's ability to progress to PLOF and address remaining functional goals. (see flow sheet as applicable)     Details if applicable:       15  45888 Neuromuscular Re-Education (timed):  improve balance, coordination, kinesthetic sense, posture, core stability and proprioception to improve patient's ability to develop conscious control of individual muscles and awareness of position of extremities in order to progress to PLOF and address remaining functional goals. (see flow sheet as applicable)     Details if applicable:     10  96753 Manual Therapy (timed):  decrease pain, increase ROM, increase tissue extensibility, and decrease trigger points to improve patient's ability to progress to PLOF and address remaining functional goals.  The manual therapy interventions were performed at a separate and distinct time from the therapeutic activities interventions . (see flow sheet as applicable)     Details if applicable:    -STM/TrP release to R glute med and piriformis with Pt in sidelying   -   09866 Therapeutic Activity (timed):  use of dynamic activities replicating functional movements to increase ROM, strength, coordination, balance, and

## 2024-09-03 ENCOUNTER — HOSPITAL ENCOUNTER (OUTPATIENT)
Facility: HOSPITAL | Age: 69
Setting detail: RECURRING SERIES
Discharge: HOME | End: 2024-09-06
Payer: MEDICARE

## 2024-09-03 PROCEDURE — 97140 MANUAL THERAPY 1/> REGIONS: CPT

## 2024-09-03 PROCEDURE — 97112 NEUROMUSCULAR REEDUCATION: CPT

## 2024-09-03 PROCEDURE — 97110 THERAPEUTIC EXERCISES: CPT

## 2024-09-03 NOTE — PROGRESS NOTES
PHYSICAL / OCCUPATIONAL THERAPY - DAILY TREATMENT NOTE    Patient Name: aRkel Peña    Date: 9/3/2024    : 1955  Insurance: Payor: [facility] MEDICARE / Plan: [facility] MEDICARE ADVANTAGE / Product Type: *No Product type* /      Patient  verified Yes     Visit #   Current / Total 5 16   Time   In / Out 1020 1100   Pain   In / Out 1/10 1/10   Subjective Functional Status/Changes: Pt reports decreased pain in her R hip. States she was on her feet all weekend at a family reunion.     TREATMENT AREA =  Other low back pain [M54.59]     OBJECTIVE         Therapeutic Procedures:    Tx Min Billable or 1:1 Min (if diff from Tx Min) Procedure, Rationale, Specifics   18 18 78704 Therapeutic Exercise (timed):  increase ROM, strength, coordination, balance, and proprioception to improve patient's ability to progress to PLOF and address remaining functional goals. (see flow sheet as applicable)     Details if applicable:       10 10 11907 Neuromuscular Re-Education (timed):  improve balance, coordination, kinesthetic sense, posture, core stability and proprioception to improve patient's ability to develop conscious control of individual muscles and awareness of position of extremities in order to progress to PLOF and address remaining functional goals. (see flow sheet as applicable)     Details if applicable:      12 56990 Manual Therapy (timed):  decrease pain, increase ROM, increase tissue extensibility, and decrease trigger points to improve patient's ability to progress to PLOF and address remaining functional goals.  The manual therapy interventions were performed at a separate and distinct time from the therapeutic activities interventions . (see flow sheet as applicable)     Details if applicable: STM/TrP release to R glute med and piriformis in prone, R hip IR/ER PROM in prone          Details if applicable:            Details if applicable:     40 40 [facility] BC Totals Reminder: bill using total billable min of TIMED

## 2024-09-05 ENCOUNTER — HOSPITAL ENCOUNTER (OUTPATIENT)
Facility: HOSPITAL | Age: 69
Setting detail: RECURRING SERIES
Discharge: HOME | End: 2024-09-08
Payer: MEDICARE

## 2024-09-05 PROCEDURE — 97140 MANUAL THERAPY 1/> REGIONS: CPT

## 2024-09-05 PROCEDURE — 97110 THERAPEUTIC EXERCISES: CPT

## 2024-09-05 PROCEDURE — 97112 NEUROMUSCULAR REEDUCATION: CPT

## 2024-09-10 ENCOUNTER — HOSPITAL ENCOUNTER (OUTPATIENT)
Facility: HOSPITAL | Age: 69
Setting detail: RECURRING SERIES
Discharge: HOME | End: 2024-09-13
Payer: MEDICARE

## 2024-09-10 PROCEDURE — 97110 THERAPEUTIC EXERCISES: CPT

## 2024-09-10 PROCEDURE — 97112 NEUROMUSCULAR REEDUCATION: CPT

## 2024-09-12 ENCOUNTER — HOSPITAL ENCOUNTER (OUTPATIENT)
Facility: HOSPITAL | Age: 69
Setting detail: RECURRING SERIES
Discharge: HOME | End: 2024-09-15
Payer: MEDICARE

## 2024-09-12 PROCEDURE — 97110 THERAPEUTIC EXERCISES: CPT

## 2024-09-12 PROCEDURE — 97112 NEUROMUSCULAR REEDUCATION: CPT

## 2024-09-17 ENCOUNTER — HOSPITAL ENCOUNTER (OUTPATIENT)
Facility: HOSPITAL | Age: 69
Setting detail: RECURRING SERIES
Discharge: HOME | End: 2024-09-20
Payer: MEDICARE

## 2024-09-17 PROCEDURE — 97530 THERAPEUTIC ACTIVITIES: CPT

## 2024-09-17 PROCEDURE — 97110 THERAPEUTIC EXERCISES: CPT

## 2024-09-17 PROCEDURE — 97112 NEUROMUSCULAR REEDUCATION: CPT

## 2024-09-19 ENCOUNTER — HOSPITAL ENCOUNTER (OUTPATIENT)
Facility: HOSPITAL | Age: 69
Setting detail: RECURRING SERIES
Discharge: HOME | End: 2024-09-22
Payer: MEDICARE

## 2024-09-19 PROCEDURE — 97110 THERAPEUTIC EXERCISES: CPT

## 2024-09-19 PROCEDURE — 97530 THERAPEUTIC ACTIVITIES: CPT

## 2024-09-24 ENCOUNTER — APPOINTMENT (OUTPATIENT)
Facility: HOSPITAL | Age: 69
End: 2024-09-24
Payer: MEDICARE

## 2024-09-26 ENCOUNTER — APPOINTMENT (OUTPATIENT)
Facility: HOSPITAL | Age: 69
End: 2024-09-26
Payer: MEDICARE

## 2024-09-30 NOTE — PROGRESS NOTES
ups  - Increased reps for squat with band    Progressed therex as per flowsheet to increase R hip strength and mobility. Pt able to complete exercises without increased pain or discomfort. Pt making slow progress towards LTG 2 with max pain increasing to 7/10 after a long road trip last weekend. RLE fatigue but no pain noted post treatment session. Plan to progress NV per pt tolerance.     Patient will continue to benefit from skilled PT / OT services to modify and progress therapeutic interventions, analyze and address functional mobility deficits, analyze and address ROM deficits, and analyze and address strength deficits to address functional deficits and attain remaining goals.    Progress toward goals / Updated goals:  []  See Progress Note/Recertification    SHORT TERM GOALS:  Short Term Goals: To be accomplished in 2 WEEKS  1.  Pt will be educated in/compliant with appropriate HEP to decrease pain, increase ROM, increase strength and return pt to PLOF.    Status at last note/certification: issued at [facility]  Current:  8/27/2024 Progressing pt stated she has been doing HEP everyday but yesterday due to pain.  She stated she is doing them 1 times a day.      2. Pt will increase b/l glute med/max strength by at least 1/3 MMT for improved core stability with standing/walking.  Status at last note/certification: ext R 3-/5, L 3/5; abd R 3-/5, L 3-/5  Current: MET, abd: R: 4/5, L: 4+/5, ext: R: 4-/5, L: 4/5     3. Patient will report at least 50% functional improvement with sitting tolerance related to low back/R hip pain.  Status at last note/certification: limited tolerance for sitting, especially on soft surfaces.  Current:  MET, 90% improvement reported      Long Term Goals: To be accomplished in 8 WEEKS  1. Pt will improve Oswestry score to </= 29 to demo a significant improvement in functional activity tolerance.  Status at last note/certification: 42%  Current:  MET, 22%      2. Pt will report pain </=1/10 with

## 2024-10-01 ENCOUNTER — HOSPITAL ENCOUNTER (OUTPATIENT)
Facility: HOSPITAL | Age: 69
Setting detail: RECURRING SERIES
Discharge: HOME | End: 2024-10-04
Payer: MEDICARE

## 2024-10-01 PROCEDURE — 97530 THERAPEUTIC ACTIVITIES: CPT

## 2024-10-01 PROCEDURE — 97110 THERAPEUTIC EXERCISES: CPT

## 2024-10-02 NOTE — PROGRESS NOTES
score to </= 29 to demo a significant improvement in functional activity tolerance.  Status at last note/certification: 42%  Current:  MET, 22%      2. Pt will report pain </=1/10 with prolonged sitting activity for decreased impairment with driving.  Status at last note/certification: max pain 10/10 and limited sitting tolerance.  Current:  progressing, 3/10 max pain reported; 10/1/24:  Regressing, 7/10 max pain after road trip last weekend     3. Pt will improve b/l glute med/max strength to at least 4/5 for decreased LBP with ADL's.  Status at last note/certification: ext R 3-/5, L 3/5; abd R 3-/5, L 3-/5  Current:  10/3/24: Progressing, added 1/2 prone DK/FH    Next PN/ RC due 10/19/2024  Auth due (visit number/ date) NAR/ 12/31    PLAN  - Continue Plan of Care  - Upgrade activities as tolerated    Roosevelt Jimenez PTA    10/3/2024    10:38 AM  If an interpreting service was utilized for treatment of this patient, the contents of this document represent the material reviewed with the patient via the .     Future Appointments   Date Time Provider Department Center   10/3/2024 10:20 AM Roosevelt Jimenez PTA [facility]   10/8/2024 10:20 AM Roosevelt Jimenez PTA [facility]   10/10/2024 10:20 AM Roosevelt Jimenez PTA [facility]   10/15/2024 10:20 AM Roosevelt Jimenez PTA [facility]   10/17/2024 10:20 AM Roosevelt Jimenez PTA [facility]

## 2024-10-03 ENCOUNTER — HOSPITAL ENCOUNTER (OUTPATIENT)
Facility: HOSPITAL | Age: 69
Setting detail: RECURRING SERIES
Discharge: HOME | End: 2024-10-06
Payer: MEDICARE

## 2024-10-03 PROCEDURE — 97112 NEUROMUSCULAR REEDUCATION: CPT

## 2024-10-03 PROCEDURE — 97530 THERAPEUTIC ACTIVITIES: CPT

## 2024-10-03 PROCEDURE — 97110 THERAPEUTIC EXERCISES: CPT

## 2024-10-08 ENCOUNTER — APPOINTMENT (OUTPATIENT)
Facility: HOSPITAL | Age: 69
End: 2024-10-08
Payer: MEDICARE

## 2024-10-09 NOTE — PROGRESS NOTES
PHYSICAL / OCCUPATIONAL THERAPY - DAILY TREATMENT NOTE    Patient Name: Rakel Peña    Date: 10/10/2024    : 1955  Insurance: Payor: [facility] MEDICARE / Plan: [facility] MEDICARE ADVANTAGE / Product Type: *No Product type* /      Patient  verified Yes     Visit #   Current / Total 13 16   Time   In / Out 1020 1100   Pain   In / Out 1 0   Subjective Functional Status/Changes: Pt states hip feels better today and reports decreased pain. States she was sick all of last week and was not able to attend therapy.     TREATMENT AREA =  Other low back pain [M54.59]     OBJECTIVE         Therapeutic Procedures:    Tx Min Billable or 1:1 Min (if diff from Tx Min) Procedure, Rationale, Specifics   18 18 87596 Therapeutic Exercise (timed):  increase ROM, strength, coordination, balance, and proprioception to improve patient's ability to progress to PLOF and address remaining functional goals. (see flow sheet as applicable)     Details if applicable:  see flow sheet     10 10 91184 Therapeutic Activity (timed):  use of dynamic activities replicating functional movements to increase ROM, strength, coordination, balance, and proprioception in order to improve patient's ability to progress to PLOF and address remaining functional goals.  (see flow sheet as applicable)     Details if applicable:  see flow sheet   12 12 14741 Neuromuscular Re-Education (timed):  improve balance, coordination, kinesthetic sense, posture, core stability and proprioception to improve patient's ability to develop conscious control of individual muscles and awareness of position of extremities in order to progress to PLOF and address remaining functional goals. (see flow sheet as applicable)     Details if applicable:  see flow sheet          Details if applicable:            Details if applicable:     40 40 [facility] BC Totals Reminder: bill using total billable min of TIMED therapeutic procedures (example: do not include dry needle or estim unattended,

## 2024-10-10 ENCOUNTER — HOSPITAL ENCOUNTER (OUTPATIENT)
Facility: HOSPITAL | Age: 69
Setting detail: RECURRING SERIES
Discharge: HOME | End: 2024-10-13
Payer: MEDICARE

## 2024-10-10 PROCEDURE — 97530 THERAPEUTIC ACTIVITIES: CPT

## 2024-10-10 PROCEDURE — 97112 NEUROMUSCULAR REEDUCATION: CPT

## 2024-10-10 PROCEDURE — 97110 THERAPEUTIC EXERCISES: CPT

## 2024-10-15 ENCOUNTER — HOSPITAL ENCOUNTER (OUTPATIENT)
Facility: HOSPITAL | Age: 69
Setting detail: RECURRING SERIES
Discharge: HOME | End: 2024-10-18
Payer: MEDICARE

## 2024-10-15 PROCEDURE — 97110 THERAPEUTIC EXERCISES: CPT

## 2024-10-15 PROCEDURE — 97535 SELF CARE MNGMENT TRAINING: CPT

## 2024-10-15 PROCEDURE — 97530 THERAPEUTIC ACTIVITIES: CPT

## 2024-10-15 NOTE — THERAPY DISCHARGE
knowledge and understanding of physical therapy expectations, procedures and progression  to improve patient's ability to progress to PLOF and address remaining functional goals.  (see flow sheet as applicable)     Details if applicable:  Pt education, HEP review and demo          Details if applicable:            Details if applicable:     40 40 [facility] Totals Reminder: bill using total billable min of TIMED therapeutic procedures (example: do not include dry needle or estim unattended, both untimed codes, in totals to left)  8-22 min = 1 unit; 23-37 min = 2 units; 38-52 min = 3 units; 53-67 min = 4 units; 68-82 min = 5 units   Total Total       [x]  Patient Education billed concurrently with other procedures   [x] Review HEP    [] Progressed/Changed HEP, detail:    [] Other detail:       Objective Information/Functional Measures/Assessment    Functional Gains: Increased walking tolerance, increased strength, increased mobility, improved stair tolerance, improved tolerance to household chores and activities  Functional Deficits: Pain with activity  % improvement: 90%  Pain   Average: 2-3/10       Best: 1/10     Worst: 4/10    ext R 4/5, L 4/5; abd R 4+/5, L 4+/5    GOALS    Short Term Goals: To be accomplished in 2 WEEKS  1.  Pt will be educated in/compliant with appropriate HEP to decrease pain, increase ROM, increase strength and return pt to PLOF.    Status at last note/certification: issued at [facility]  Current:  10/15/24: Goal met     2. Pt will increase b/l glute med/max strength by at least 1/3 MMT for improved core stability with standing/walking.  Status at last note/certification: ext R 3-/5, L 3/5; abd R 3-/5, L 3-/5  Current: MET, abd: R: 4/5, L: 4+/5, ext: R: 4-/5, L: 4/5     3. Patient will report at least 50% functional improvement with sitting tolerance related to low back/R hip pain.  Status at last note/certification: limited tolerance for sitting, especially on soft surfaces.  Current:  MET, 90%

## 2024-10-17 ENCOUNTER — APPOINTMENT (OUTPATIENT)
Facility: HOSPITAL | Age: 69
End: 2024-10-17
Payer: MEDICARE